# Patient Record
Sex: MALE | URBAN - METROPOLITAN AREA
[De-identification: names, ages, dates, MRNs, and addresses within clinical notes are randomized per-mention and may not be internally consistent; named-entity substitution may affect disease eponyms.]

---

## 2023-09-19 ENCOUNTER — APPOINTMENT (OUTPATIENT)
Dept: URBAN - METROPOLITAN AREA CLINIC 216 | Age: 23
Setting detail: DERMATOLOGY
End: 2023-09-22

## 2023-09-19 DIAGNOSIS — L40.0 PSORIASIS VULGARIS: ICD-10-CM

## 2023-09-19 PROCEDURE — OTHER COUNSELING: OTHER

## 2023-09-19 PROCEDURE — 99024 POSTOP FOLLOW-UP VISIT: CPT

## 2023-09-19 ASSESSMENT — PGA PSORIASIS: PGA PSORIASIS 2020: SEVERE

## 2023-09-19 ASSESSMENT — BSA PSORIASIS: % BODY COVERED IN PSORIASIS: 95

## 2023-09-19 NOTE — PROCEDURE: COUNSELING
Patient Specific Counseling (Will Not Stick From Patient To Patient): TB test from 9/15/23 was negative (in Hospital Records).\\n\\nPt was advised to taper his prednisone (prescribed by hospital) slowly over 1 month. \\n\\nDiscussed seeking care at [facility] or other free clinic to reduce health care cost.  Advised to try to get healthcare coverage and to notify us if he gets covered as he may no longer qualify for free Taltz under those circumstances.\\n\\nSample of two pens of Taltz 80 mg/ ml was provided to patient today for starter dosing (Lot# [id number], exp 9/2023). Paperwork for patient assistance was filled out by patient. Patient Specific Counseling (Will Not Stick From Patient To Patient): TB test from 9/15/23 was negative (in Hospital Records).\\n\\nPt was advised to taper his prednisone (prescribed by hospital) slowly over 1 month. \\n\\nDiscussed seeking care at [facility] or other free clinic to reduce health care cost.  Advised to try to get healthcare coverage and to notify us if he gets covered as he may no longer qualify for free Taltz under those circumstances.\\n\\nSample of two pens of Taltz 80 mg/ ml was provided to patient today for starter dosing (Lot# [id number], exp 9/2023). Paperwork for patient assistance was filled out by patient.

## 2023-09-19 NOTE — PROCEDURE: COUNSELING
Quality 176: Tuberculosis Screening Prior To First Course Of Biologic And/Or Immune Response Modifier Therapy: Patient receiving first-time biologic and/or immune response modifier therapy, TB Screening Performed and Results Interpreted within 12 months